# Patient Record
(demographics unavailable — no encounter records)

---

## 2025-01-08 NOTE — HISTORY OF PRESENT ILLNESS
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  Presented to  ED on 11/26 following questionable event on 11/25.   Pt reports she was awake throughout but foamed at the mouth and had involuntary twitches. Shortly before these episodes, she used synthetic cannabis or what she believed was K2 for the first time. Pt denies any other drug/alcohol use. Pt never had a seizure or seizure like activity before. She denies history of head injury.